# Patient Record
(demographics unavailable — no encounter records)

---

## 2025-04-07 NOTE — ASSESSMENT
[FreeTextEntry1] : 67 y/o female with underlying severe osteoarthritis greatest at the patellofemoral joints but there are tricompartmental changes.  She had acute pain about 2-1/2 weeks ago that has partially improved since then.  I think she may have had a degenerative meniscus tear occur.  She did have some atypical pain into the lower leg that does not seem to be a lumbar radicular but perhaps she had some swelling into the lower leg contributing to the pain. Her pain is all getting partially better now.  I recommended continuing with Naprosyn and using heat and ice and I gave her home exercises, a knee sleeve and referred her to physical therapy. If it is not getting better we can do a steroid injection.  Steroid injection was offered today but she is going to try the therapy first which is reasonable.  If it continues to hurt then we may want to consider an MRI to workup further but at this point I did not feel there was a surgical issue. Follow-up in about 3 weeks to check progress.  If it is getting worse she may want to come in sooner

## 2025-04-07 NOTE — PHYSICAL EXAM
[Cane] : ambulates with cane [de-identified] : Knees Nonantalgic gait over short distance. Flatfeet. 1+ left knee effusion but not on the right knee. No erythema or ecchymoses. Knee range of motion 0 to 120 degrees versus 130 degrees on the right knee.  Pain on full flexion left knee. Negative Francisca left knee. Ia Lachman.  Negative anterior and posterior drawer.  Normal varus and valgus laxity.  Intact extensor mechanism. Normal neurovascular exam [de-identified] :   X-rays ordered, performed and reviewed today of bilateral knees for knee pain weightbearing 4 views showed tricompartmental osteoarthritis bilateral knees that is bone-on-bone, severe bilateral patellofemoral joints and more mild in the medial and lateral compartments.  The right knee medial compartment narrowing is slightly greater than the left. KL 4 arthritis in the patellofemoral joints   X-rays ordered, performed and reviewed today of LEFT tibia/fibula AP and lateral views showed no abnormalities

## 2025-04-07 NOTE — PHYSICAL EXAM
[Cane] : ambulates with cane [de-identified] : Knees Nonantalgic gait over short distance. Flatfeet. 1+ left knee effusion but not on the right knee. No erythema or ecchymoses. Knee range of motion 0 to 120 degrees versus 130 degrees on the right knee.  Pain on full flexion left knee. Negative Francisca left knee. Ia Lachman.  Negative anterior and posterior drawer.  Normal varus and valgus laxity.  Intact extensor mechanism. Normal neurovascular exam [de-identified] :   X-rays ordered, performed and reviewed today of bilateral knees for knee pain weightbearing 4 views showed tricompartmental osteoarthritis bilateral knees that is bone-on-bone, severe bilateral patellofemoral joints and more mild in the medial and lateral compartments.  The right knee medial compartment narrowing is slightly greater than the left. KL 4 arthritis in the patellofemoral joints   X-rays ordered, performed and reviewed today of LEFT tibia/fibula AP and lateral views showed no abnormalities

## 2025-04-07 NOTE — HISTORY OF PRESENT ILLNESS
[de-identified] : 66 year old woman who comes in for evaluation for LEFT knee pain that started about 3 weeks ago. She had done a lot of walking and started to get tightness and pain in lower leg below knee. It was painful and she could not walk any distance and had to stop and sit down for some time. She elevated and iced it for a couple days. She could sit and stand but walking remained very painful for about a week. Then a few days later her knee became painful and swollen. She felt that her knee was hot. She took Naproxen 500mg BID. The swelling and pain did go down somewhat. She borrowed a cane from a friend to help her walk. She has felt like the knee will buckle. More recently her RIGHT medial knee has become painful.  The pain in the lower leg is much less.  Pain was in the back of the knee but also the inside part of the knee.  Her knee pain is better with rest and medication and worse with twisting and pivoting.  The right knee is worse at night.  She has not done any treatment for it yet or seen anyone. She rates current pain 3-4/10.  She has had intermittent knee discomfort over the years with stairs and some clicking in the knees. When she was very young she was told that there is malalignment of her patellas and she would be prone to arthritis.

## 2025-04-07 NOTE — HISTORY OF PRESENT ILLNESS
[de-identified] : 66 year old woman who comes in for evaluation for LEFT knee pain that started about 3 weeks ago. She had done a lot of walking and started to get tightness and pain in lower leg below knee. It was painful and she could not walk any distance and had to stop and sit down for some time. She elevated and iced it for a couple days. She could sit and stand but walking remained very painful for about a week. Then a few days later her knee became painful and swollen. She felt that her knee was hot. She took Naproxen 500mg BID. The swelling and pain did go down somewhat. She borrowed a cane from a friend to help her walk. She has felt like the knee will buckle. More recently her RIGHT medial knee has become painful.  The pain in the lower leg is much less.  Pain was in the back of the knee but also the inside part of the knee.  Her knee pain is better with rest and medication and worse with twisting and pivoting.  The right knee is worse at night.  She has not done any treatment for it yet or seen anyone. She rates current pain 3-4/10.  She has had intermittent knee discomfort over the years with stairs and some clicking in the knees. When she was very young she was told that there is malalignment of her patellas and she would be prone to arthritis.

## 2025-04-07 NOTE — ASSESSMENT
[FreeTextEntry1] : 65 y/o female with underlying severe osteoarthritis greatest at the patellofemoral joints but there are tricompartmental changes.  She had acute pain about 2-1/2 weeks ago that has partially improved since then.  I think she may have had a degenerative meniscus tear occur.  She did have some atypical pain into the lower leg that does not seem to be a lumbar radicular but perhaps she had some swelling into the lower leg contributing to the pain. Her pain is all getting partially better now.  I recommended continuing with Naprosyn and using heat and ice and I gave her home exercises, a knee sleeve and referred her to physical therapy. If it is not getting better we can do a steroid injection.  Steroid injection was offered today but she is going to try the therapy first which is reasonable.  If it continues to hurt then we may want to consider an MRI to workup further but at this point I did not feel there was a surgical issue. Follow-up in about 3 weeks to check progress.  If it is getting worse she may want to come in sooner

## 2025-04-28 NOTE — HISTORY OF PRESENT ILLNESS
[de-identified] : 66 year old woman who comes in for evaluation for LEFT knee pain that started about 6 weeks ago.  She has been doing the exercises on her own and is going to start physical therapy this week. She is taking Naproxyn 500 AM and Aleve some nights Occ feeling locking. Achy and tight Dec'd swelling. She uses a knee sleeve which helps and she uses a cane as needed. Her knee can feel tight and variable pain. She also has some pain in her feet. She did get more cushioned sneakers with some rocker-bottom.  It feels a little funny to her but does provide more cushion. Uses Cane with stairs Doing home exercise program

## 2025-04-28 NOTE — HISTORY OF PRESENT ILLNESS
[de-identified] : 66 year old woman who comes in for evaluation for LEFT knee pain that started about 6 weeks ago.  She has been doing the exercises on her own and is going to start physical therapy this week. She is taking Naproxyn 500 AM and Aleve some nights Occ feeling locking. Achy and tight Dec'd swelling. She uses a knee sleeve which helps and she uses a cane as needed. Her knee can feel tight and variable pain. She also has some pain in her feet. She did get more cushioned sneakers with some rocker-bottom.  It feels a little funny to her but does provide more cushion. Uses Cane with stairs Doing home exercise program

## 2025-04-28 NOTE — ASSESSMENT
[FreeTextEntry1] : 67 y/o female with underlying severe osteoarthritis greatest at the patellofemoral joints but there are tricompartmental changes.  She is having ongoing pain although some improvement since I last saw her with rest. She is just starting the formal physical therapy this week which she will start doing.  Naprosyn as needed.  Heat and ice as needed.  Knee sleeve and cane as needed. It should be feeling progressively better.  If not and if she has ongoing sense of intermittent buckling or increasing pain then she should get an MRI scan of the knee. We discussed exercises that she should and should not do. She has good shoes now which I think are good for the hallux rigidus and likely arthritis in the hallux MP joints which also affect her gait. We discussed possible steroid and hyaluronic acid injections as options. She asked about surgery.  If there is a meniscus tear sometimes arthroscopy would be done but when there is a lot of arthritis results can be less predictable. Follow-up in about 1 month.

## 2025-04-28 NOTE — PHYSICAL EXAM
[Cane] : ambulates with cane [LE] : Sensory: Intact in bilateral lower extremities [Normal RLE] : Right Lower Extremity: No scars, rashes, lesions, ulcers, skin intact [Normal LLE] : Left Lower Extremity: No scars, rashes, lesions, ulcers, skin intact [Normal Touch] : sensation intact for touch [Normal] : Oriented to person, place, and time, insight and judgement were intact and the affect was normal [Slightly Antalgic] : slightly antalgic [de-identified] : Knees Nonantalgic gait over short distance. Flatfeet.  Hallux rigidus with obvious arthritis left greater than right hallux MP joint with virtually no motion. No significant left knee effusion.  Negative effusion right No erythema or ecchymoses. Knee range of motion 0 to 125 degrees versus 130 degrees on the right knee.  Pain on full flexion left knee. Negative Francisca left knee. Tender patella facets left greater than right and medial joint line bilaterally both knees Ia Lachman.  Negative anterior and posterior drawer.  Normal varus and valgus laxity.  Intact extensor mechanism. Normal neurovascular exam [de-identified] :   X-rays 4/7/25 of bilateral knees weightbearing 4 views showed tricompartmental osteoarthritis bilateral knees that is bone-on-bone, severe bilateral patellofemoral joints and more mild in the medial and lateral compartments.  The right knee medial compartment narrowing is slightly greater than the left. KL 4 arthritis in the patellofemoral joints   X-rays 4/7/25 of LEFT tibia/fibula AP and lateral views showed no abnormalities

## 2025-04-28 NOTE — PHYSICAL EXAM
[Cane] : ambulates with cane [LE] : Sensory: Intact in bilateral lower extremities [Normal RLE] : Right Lower Extremity: No scars, rashes, lesions, ulcers, skin intact [Normal LLE] : Left Lower Extremity: No scars, rashes, lesions, ulcers, skin intact [Normal Touch] : sensation intact for touch [Normal] : Oriented to person, place, and time, insight and judgement were intact and the affect was normal [Slightly Antalgic] : slightly antalgic [de-identified] : Knees Nonantalgic gait over short distance. Flatfeet.  Hallux rigidus with obvious arthritis left greater than right hallux MP joint with virtually no motion. No significant left knee effusion.  Negative effusion right No erythema or ecchymoses. Knee range of motion 0 to 125 degrees versus 130 degrees on the right knee.  Pain on full flexion left knee. Negative Francisca left knee. Tender patella facets left greater than right and medial joint line bilaterally both knees Ia Lachman.  Negative anterior and posterior drawer.  Normal varus and valgus laxity.  Intact extensor mechanism. Normal neurovascular exam [de-identified] :   X-rays 4/7/25 of bilateral knees weightbearing 4 views showed tricompartmental osteoarthritis bilateral knees that is bone-on-bone, severe bilateral patellofemoral joints and more mild in the medial and lateral compartments.  The right knee medial compartment narrowing is slightly greater than the left. KL 4 arthritis in the patellofemoral joints   X-rays 4/7/25 of LEFT tibia/fibula AP and lateral views showed no abnormalities

## 2025-05-29 NOTE — HISTORY OF PRESENT ILLNESS
[de-identified] : 66 year old woman who comes in for evaluation for LEFT knee pain that started about 2+ mos ago.  She is no longer having any of the severe knee pain but she does get aches in both knees worse on the left than right.  She is doing physical therapy which can make her sore but also feels that it is helping.  She is finished taking the Naprosyn and was wondering if she could have more if needed. No locking or buckling.  Stairs are most painful or squatting

## 2025-05-29 NOTE — PHYSICAL EXAM
[Cane] : ambulates with cane [LE] : Sensory: Intact in bilateral lower extremities [Normal RLE] : Right Lower Extremity: No scars, rashes, lesions, ulcers, skin intact [Normal LLE] : Left Lower Extremity: No scars, rashes, lesions, ulcers, skin intact [Normal Touch] : sensation intact for touch [Normal] : Oriented to person, place, and time, insight and judgement were intact and the affect was normal [Slightly Antalgic] : slightly antalgic [de-identified] : Knees Nonantalgic gait over short distance. No significant knee effusion.  No erythema or ecchymoses. Knee range of motion 0 to 125 degrees versus 130 degrees on the right knee.  Pain on full flexion left knee.  Positive crepitus Negative Francisca . Tender patella facets left greater than right and medial joint line bilaterally both knees Ia Lachman.  Negative anterior and posterior drawer.  Normal varus and valgus laxity.  Intact extensor mechanism. Normal neurovascular exam [de-identified] :   X-rays 4/7/25 of bilateral knees weightbearing 4 views showed tricompartmental osteoarthritis bilateral knees that is bone-on-bone, severe bilateral patellofemoral joints and more mild in the medial and lateral compartments.  The right knee medial compartment narrowing is slightly greater than the left. KL 4 arthritis in the patellofemoral joints   X-rays 4/7/25 of LEFT tibia/fibula AP and lateral views showed no abnormalities

## 2025-05-29 NOTE — ASSESSMENT
[FreeTextEntry1] : 65 y/o female with underlying severe osteoarthritis greatest at the patellofemoral joints but there are tricompartmental changes.  Initially she had left lower leg pain which has resolved and now her pain is mostly just in the knees. She is improving with therapy and will continue to do therapy and home exercises.  I renewed the Naprosyn but take just as needed.  We talked about various injections.  She has never had any knee injections.  We talked about pros and cons of steroid and hyaluronic acid injections which she may consider. At this point I think her pain is mainly from the arthritis but she could also have degenerative meniscus tear contributing to pain.  If she has worsening pain again then we may consider MRI. She will do another 6 to 8 weeks of therapy and follow-up if not better at that time or follow-up as needed